# Patient Record
Sex: MALE
[De-identification: names, ages, dates, MRNs, and addresses within clinical notes are randomized per-mention and may not be internally consistent; named-entity substitution may affect disease eponyms.]

---

## 2024-02-01 PROBLEM — Z00.00 ENCOUNTER FOR PREVENTIVE HEALTH EXAMINATION: Status: ACTIVE | Noted: 2024-02-01

## 2024-02-05 ENCOUNTER — APPOINTMENT (OUTPATIENT)
Dept: CARDIOLOGY | Facility: CLINIC | Age: 49
End: 2024-02-05
Payer: COMMERCIAL

## 2024-02-05 VITALS
BODY MASS INDEX: 28.32 KG/M2 | RESPIRATION RATE: 16 BRPM | DIASTOLIC BLOOD PRESSURE: 100 MMHG | SYSTOLIC BLOOD PRESSURE: 140 MMHG | HEART RATE: 89 BPM | WEIGHT: 170 LBS | OXYGEN SATURATION: 98 % | HEIGHT: 65 IN

## 2024-02-05 DIAGNOSIS — Z87.891 PERSONAL HISTORY OF NICOTINE DEPENDENCE: ICD-10-CM

## 2024-02-05 DIAGNOSIS — Z82.49 FAMILY HISTORY OF ISCHEMIC HEART DISEASE AND OTHER DISEASES OF THE CIRCULATORY SYSTEM: ICD-10-CM

## 2024-02-05 DIAGNOSIS — F41.9 ANXIETY DISORDER, UNSPECIFIED: ICD-10-CM

## 2024-02-05 DIAGNOSIS — Z78.9 OTHER SPECIFIED HEALTH STATUS: ICD-10-CM

## 2024-02-05 DIAGNOSIS — K21.9 GASTRO-ESOPHAGEAL REFLUX DISEASE W/OUT ESOPHAGITIS: ICD-10-CM

## 2024-02-05 DIAGNOSIS — Z72.3 LACK OF PHYSICAL EXERCISE: ICD-10-CM

## 2024-02-05 DIAGNOSIS — I10 ESSENTIAL (PRIMARY) HYPERTENSION: ICD-10-CM

## 2024-02-05 PROCEDURE — 93000 ELECTROCARDIOGRAM COMPLETE: CPT

## 2024-02-05 PROCEDURE — 99204 OFFICE O/P NEW MOD 45 MIN: CPT

## 2024-02-05 RX ORDER — METOPROLOL SUCCINATE 50 MG/1
50 TABLET, EXTENDED RELEASE ORAL DAILY
Qty: 90 | Refills: 3 | Status: ACTIVE | COMMUNITY

## 2024-02-05 RX ORDER — BUPROPION HYDROCHLORIDE 150 MG/1
150 TABLET, FILM COATED, EXTENDED RELEASE ORAL
Qty: 180 | Refills: 3 | Status: ACTIVE | COMMUNITY

## 2024-02-05 NOTE — PHYSICAL EXAM
[de-identified] :                    Well appearing and nourished with no obvious deformities or distress.  Eyes:  No conjunctival injection and no xanthelasmas. HEENT:  Normocephalic.Normal oral mucosa. No pallor or cyanosis Neck:  No jugular venous distension. with normal A and V wave forms. No palpable adenopathy. Cardiovascular:  Normal rate and rhythm with normal S1, S2 and a grade 1/6 systolic murmur. Distal arterial pulses are normal. No significant peripheral edema. Pulmonary:  Lungs are clear to auscultation and percussion. Normal respiratory pattern without any accessory muscle use Abdomen:  Soft, non-tender ; no palpable organomegaly or masses. Extremities: No digital clubbing, cyanosis or ischemic changes. Skin:  No skin lesions, rashes, ulcers or xanthomas. Psychiatric:  Alert and oriented to person, place and time. Appropriate mood and affect.

## 2024-02-05 NOTE — ASSESSMENT
[FreeTextEntry1] : ECG: Sinus arrhythmia at 89 bpm with otherwise normal axis intervals and no significant ST-T wave changes.  Laboratory data: --------- 4/17/2023 Chol------ 191 HDL-------- 52 LDL--------- 97 Trig-------- 204  Impression: 1.  Relatively new onset of hypertension with associated dizziness.  2.  Anxiety perhaps driving some of the above.  3.  Absent is any regular exercise program but and improve dietary and sodium restricted diet.  4.  Hypertriglyceridemia is noted.  Plan: 1.  Extensively reviewed the importance of a regular symptom limited exercise program.  2.  Reinforced the need for 2000 mg sodium as well as carbohydrate and fat restricted diet  3.  Baseline echocardiogram and exercise stress test will be obtained  4.  Patient will monitor blood pressure at home for 2 weeks and then bring that information as well as his machine and to be assessed.  5.  Be interesting to see the results of serial home blood pressure monitoring after aggressive attempts at diet, exercise and 2 months of bupropion.

## 2024-02-05 NOTE — REASON FOR VISIT
[FreeTextEntry1] : 48-year-old male with no pre-existing history of cardiovascular disease presents here for initial evaluation with concerns about recently detected elevated blood pressure.  The patient denies diabetes known hyperlipidemia, current tobacco use or family history of premature coronary disease.  In early December 2023 patient began noticing episodes of dizziness.  On visit to PMD Dr. Lua he was found to elevated blood pressure which continued on and off through the month. He was started on bupropion because of history of anxiety as well.  Metoprolol was begun about 3 weeks ago with some improvement in the dizziness. He has been checking his blood pressure at home and believes that it has demonstrated some improvement.  120-135/80-88.  Patient has a remote history of significant GERD, which she states is typically associate with chest discomfort dizziness arm numbness.  The symptoms been on and off for nearly 10 years or previously evaluated.  He does not exercise regularly. Admits to excess EtOH. Has been more judicious about diet in terms of sodium intake as well as avoiding excess carbs and fa  No PND, orthopnea, palpitations. No exertional chest pain or shortness of breath.

## 2024-04-03 ENCOUNTER — APPOINTMENT (OUTPATIENT)
Dept: CARDIOLOGY | Facility: CLINIC | Age: 49
End: 2024-04-03

## 2024-05-02 ENCOUNTER — APPOINTMENT (OUTPATIENT)
Dept: CARDIOLOGY | Facility: CLINIC | Age: 49
End: 2024-05-02

## 2024-05-15 ENCOUNTER — APPOINTMENT (OUTPATIENT)
Dept: CARDIOLOGY | Facility: CLINIC | Age: 49
End: 2024-05-15

## 2024-05-17 NOTE — PHYSICAL EXAM
[de-identified] :                    Well appearing and nourished with no obvious deformities or distress.  Eyes:  No conjunctival injection and no xanthelasmas. HEENT:  Normocephalic.Normal oral mucosa. No pallor or cyanosis Neck:  No jugular venous distension. with normal A and V wave forms. No palpable adenopathy. Cardiovascular:  Normal rate and rhythm with normal S1, S2 and a grade 1/6 systolic murmur. Distal arterial pulses are normal. No significant peripheral edema. Pulmonary:  Lungs are clear to auscultation and percussion. Normal respiratory pattern without any accessory muscle use Abdomen:  Soft, non-tender ; no palpable organomegaly or masses. Extremities: No digital clubbing, cyanosis or ischemic changes. Skin:  No skin lesions, rashes, ulcers or xanthomas. Psychiatric:  Alert and oriented to person, place and time. Appropriate mood and affect.